# Patient Record
Sex: MALE | Race: BLACK OR AFRICAN AMERICAN | Employment: PART TIME | URBAN - METROPOLITAN AREA
[De-identification: names, ages, dates, MRNs, and addresses within clinical notes are randomized per-mention and may not be internally consistent; named-entity substitution may affect disease eponyms.]

---
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
## 2019-06-11 ENCOUNTER — OFFICE VISIT (OUTPATIENT)
Dept: PRIMARY CARE CLINIC | Age: 19
End: 2019-06-11
Payer: MEDICAID

## 2019-06-11 VITALS
BODY MASS INDEX: 23.8 KG/M2 | HEIGHT: 75 IN | HEART RATE: 61 BPM | TEMPERATURE: 98.2 F | WEIGHT: 191.4 LBS | DIASTOLIC BLOOD PRESSURE: 69 MMHG | SYSTOLIC BLOOD PRESSURE: 125 MMHG

## 2019-06-11 DIAGNOSIS — Z72.89 OTHER PROBLEMS RELATED TO LIFESTYLE: ICD-10-CM

## 2019-06-11 DIAGNOSIS — Z71.6 TOBACCO ABUSE COUNSELING: ICD-10-CM

## 2019-06-11 DIAGNOSIS — Z76.89 ENCOUNTER TO ESTABLISH CARE: ICD-10-CM

## 2019-06-11 DIAGNOSIS — Z72.0 TOBACCO ABUSE: Primary | ICD-10-CM

## 2019-06-11 PROCEDURE — G8420 CALC BMI NORM PARAMETERS: HCPCS | Performed by: PHYSICIAN ASSISTANT

## 2019-06-11 PROCEDURE — G8427 DOCREV CUR MEDS BY ELIG CLIN: HCPCS | Performed by: PHYSICIAN ASSISTANT

## 2019-06-11 PROCEDURE — 99203 OFFICE O/P NEW LOW 30 MIN: CPT | Performed by: PHYSICIAN ASSISTANT

## 2019-06-11 PROCEDURE — 4004F PT TOBACCO SCREEN RCVD TLK: CPT | Performed by: PHYSICIAN ASSISTANT

## 2019-06-11 ASSESSMENT — ENCOUNTER SYMPTOMS: COUGH: 1

## 2019-06-11 NOTE — PROGRESS NOTES
[facility]  [address]  [address]  Dept: [phone]  Dept Fax: [phone]    New Patient Visit Note  Date of patient's visit:6/11/2019  Patient's Name:  Ariela Chand YOB: 2000            Patient Care Team:  Santo Moraes PA-C as PCP - General (Physician Assistant)  Santo Moraes PA-C as PCP - [facility]  Tucker Cano MD  ______________________________________________________________________    Reason for visit: Establish care/Preventative care  ______________________________________________________________________  Chief Compliant   Establish Care      ______________________________________________________________________  History of Presenting Illness:  History was obtained from the patient. Ariela Chand is a 20 y.o. is here to establish care. Patient denies any complaints in office. Patient denies any chronic past medical history. Patient blood pressure stable in office. Patient weight is stable. Patient is a smoker, 3 to 4 cigars daily. Discussed tobacco cessation and risk of smoking, encourage patient to quit smoking. Labs reviewed, inform patient lab work will be ordered to have completed before follow-up appointment. Health maintenance reviewed. Medications reviewed. HPI  ______________________________________________________________________  Past Medical/Surgical History:    History reviewed. No pertinent past medical history. History reviewed. No pertinent surgical history.     ______________________________________________________________________  Health Maintenance Due   Topic Date Due    Pneumococcal 0-64 years Vaccine (1 of 1 - PPSV23) 03/30/2005    Varicella Vaccine (1 of 2 - 13+ 2-dose series) 03/30/2012    HPV vaccine (1 - Male 3-dose series) 03/30/2014    HIV screen  03/30/2014    DTaP/Tdap/Td vaccine (1 - Tdap) 03/30/2018       No Known Future        ______________________________________________________________________  Follow up and instructions:  Return in about 2 months (around 8/11/2019) for Physical, Lab Review. · Denver Grey received counseling on the following healthy behaviors:tobacco cessation    · Discussed use, benefit, and side effects of prescribed medications. Barriers to medication compliance addressed. All patient questions answered. Pt voiced understanding. · Patient given educational materials - see patient instructions    Electronically signed by Yumiko Erwin PA-C on 6/11/19 at 3:35 PM    This note is created with the assistance of a speech-recognition program. While intending to generate a document that actually reflects thecontent of the visit, the document can still have some mistakes which may not have been identified and corrected by editing.

## 2019-06-17 PROBLEM — Z71.6 TOBACCO ABUSE COUNSELING: Status: ACTIVE | Noted: 2019-06-17

## 2019-06-17 ASSESSMENT — ENCOUNTER SYMPTOMS
DIARRHEA: 0
RHINORRHEA: 0
ABDOMINAL PAIN: 0
WHEEZING: 0
CONSTIPATION: 0
NAUSEA: 0
SHORTNESS OF BREATH: 0
VOMITING: 0
SORE THROAT: 0
BACK PAIN: 0

## 2020-06-10 ENCOUNTER — HOSPITAL ENCOUNTER (EMERGENCY)
Age: 20
Discharge: HOME | End: 2020-06-10
Attending: EMERGENCY MEDICINE
Payer: COMMERCIAL

## 2020-06-10 VITALS
OXYGEN SATURATION: 100 % | HEART RATE: 60 BPM | HEIGHT: 75 IN | BODY MASS INDEX: 28.1 KG/M2 | SYSTOLIC BLOOD PRESSURE: 148 MMHG | TEMPERATURE: 98.2 F | DIASTOLIC BLOOD PRESSURE: 82 MMHG | RESPIRATION RATE: 16 BRPM | WEIGHT: 226 LBS

## 2020-06-10 PROCEDURE — 99282 EMERGENCY DEPT VISIT SF MDM: CPT

## 2020-06-10 RX ORDER — FLUTICASONE PROPIONATE 50 MCG
1 SPRAY, SUSPENSION (ML) NASAL 2 TIMES DAILY
Qty: 1 BOTTLE | Refills: 0 | Status: SHIPPED | OUTPATIENT
Start: 2020-06-10 | End: 2022-05-03

## 2020-06-10 ASSESSMENT — ENCOUNTER SYMPTOMS
ABDOMINAL PAIN: 0
FACIAL SWELLING: 0
BACK PAIN: 0
SORE THROAT: 0
SHORTNESS OF BREATH: 0
NAUSEA: 0
VOMITING: 0
SINUS PRESSURE: 0
COUGH: 0

## 2020-06-10 ASSESSMENT — PAIN SCALES - GENERAL: PAINLEVEL_OUTOF10: 4

## 2020-06-10 NOTE — ED PROVIDER NOTES
[address] [place] [address] ED  EMERGENCY DEPARTMENT ENCOUNTER      Pt Name: Celina Starks  MRN: [id number]  [place] 2000  Date of evaluation: 6/10/20  CHIEF COMPLAINT       Chief Complaint   Patient presents with    Otalgia     HISTORY OF PRESENT ILLNESS   Presents the emergency room via private auto with right ear pain that started 4 days ago. Pain is constant without aggravating or alleviating factors. No associated congestion, sore throat, cough, chest pain, shortness of breath, body aches or fevers. This is his first evaluation for this ear pain. He has not taken anything for the pain. The history is provided by the patient. REVIEW OF SYSTEMS     Review of Systems   Constitutional: Negative for activity change, fatigue and fever. HENT: Positive for ear pain. Negative for congestion, ear discharge, facial swelling, sinus pressure and sore throat. Respiratory: Negative for cough and shortness of breath. Gastrointestinal: Negative for abdominal pain, nausea and vomiting. Musculoskeletal: Negative for back pain and myalgias. Neurological: Negative for dizziness and headaches. Psychiatric/Behavioral: Negative for confusion and decreased concentration. PASTMEDICAL HISTORY   History reviewed. No pertinent past medical history. SURGICAL HISTORY     History reviewed. No pertinent surgical history. CURRENT MEDICATIONS       Previous Medications    No medications on file     ALLERGIES     has No Known Allergies. FAMILY HISTORY     He indicated that his mother is alive. He indicated that his father is alive. He indicated that his maternal grandmother is alive. He indicated that his maternal grandfather is alive. He indicated that his paternal grandmother is . He indicated that his paternal grandfather is .      SOCIAL HISTORY       Social History     Tobacco Use    Smoking status: Current Every Day Smoker     Years: 1.00     Types: Cigars    Smokeless tobacco: Never Used   Nils Walker

## 2021-07-16 ENCOUNTER — APPOINTMENT (OUTPATIENT)
Dept: CT IMAGING | Age: 21
End: 2021-07-16
Payer: COMMERCIAL

## 2021-07-16 ENCOUNTER — HOSPITAL ENCOUNTER (EMERGENCY)
Age: 21
Discharge: HOME | End: 2021-07-16
Attending: EMERGENCY MEDICINE
Payer: COMMERCIAL

## 2021-07-16 VITALS
SYSTOLIC BLOOD PRESSURE: 134 MMHG | WEIGHT: 227 LBS | HEIGHT: 75 IN | BODY MASS INDEX: 28.23 KG/M2 | DIASTOLIC BLOOD PRESSURE: 87 MMHG | TEMPERATURE: 98.4 F | RESPIRATION RATE: 14 BRPM | HEART RATE: 98 BPM | OXYGEN SATURATION: 98 %

## 2021-07-16 DIAGNOSIS — J02.0 STREP PHARYNGITIS: Primary | ICD-10-CM

## 2021-07-16 LAB
ABSOLUTE EOS #: 0.23 K/UL (ref 0–0.44)
ABSOLUTE IMMATURE GRANULOCYTE: 0.23 K/UL (ref 0–0.3)
ABSOLUTE LYMPH #: 0.9 K/UL (ref 1.1–3.7)
ABSOLUTE MONO #: 1.58 K/UL (ref 0.1–1.2)
ANION GAP SERPL CALCULATED.3IONS-SCNC: 13 MMOL/L (ref 9–17)
BASOPHILS # BLD: 1 % (ref 0–2)
BASOPHILS ABSOLUTE: 0.23 K/UL (ref 0–0.2)
BUN BLDV-MCNC: 9 MG/DL (ref 6–20)
BUN/CREAT BLD: 12 (ref 9–20)
CALCIUM SERPL-MCNC: 9.6 MG/DL (ref 8.6–10.4)
CHLORIDE BLD-SCNC: 101 MMOL/L (ref 98–107)
CO2: 22 MMOL/L (ref 20–31)
CREAT SERPL-MCNC: 0.76 MG/DL (ref 0.7–1.2)
DIFFERENTIAL TYPE: ABNORMAL
DIRECT EXAM: ABNORMAL
EOSINOPHILS RELATIVE PERCENT: 1 % (ref 1–4)
GFR AFRICAN AMERICAN: >60 ML/MIN
GFR NON-AFRICAN AMERICAN: >60 ML/MIN
GFR SERPL CREATININE-BSD FRML MDRD: ABNORMAL ML/MIN/{1.73_M2}
GFR SERPL CREATININE-BSD FRML MDRD: ABNORMAL ML/MIN/{1.73_M2}
GLUCOSE BLD-MCNC: 109 MG/DL (ref 70–99)
HCT VFR BLD CALC: 44.7 % (ref 40.7–50.3)
HEMOGLOBIN: 14.9 G/DL (ref 13–17)
IMMATURE GRANULOCYTES: 1 %
LYMPHOCYTES # BLD: 4 % (ref 24–43)
Lab: ABNORMAL
MCH RBC QN AUTO: 29.7 PG (ref 25.2–33.5)
MCHC RBC AUTO-ENTMCNC: 33.3 G/DL (ref 28.4–34.8)
MCV RBC AUTO: 89.2 FL (ref 82.6–102.9)
MONOCYTES # BLD: 7 % (ref 3–12)
NRBC AUTOMATED: 0 PER 100 WBC
PDW BLD-RTO: 12.3 % (ref 11.8–14.4)
PLATELET # BLD: 259 K/UL (ref 138–453)
PLATELET ESTIMATE: ABNORMAL
PMV BLD AUTO: 8.8 FL (ref 8.1–13.5)
POTASSIUM SERPL-SCNC: 3.8 MMOL/L (ref 3.7–5.3)
RBC # BLD: 5.01 M/UL (ref 4.21–5.77)
RBC # BLD: ABNORMAL 10*6/UL
SEG NEUTROPHILS: 86 % (ref 36–65)
SEGMENTED NEUTROPHILS ABSOLUTE COUNT: 19.33 K/UL (ref 1.5–8.1)
SODIUM BLD-SCNC: 136 MMOL/L (ref 135–144)
SPECIMEN DESCRIPTION: ABNORMAL
WBC # BLD: 22.5 K/UL (ref 3.5–11.3)
WBC # BLD: ABNORMAL 10*3/UL

## 2021-07-16 PROCEDURE — 85025 COMPLETE CBC W/AUTO DIFF WBC: CPT

## 2021-07-16 PROCEDURE — 2500000003 HC RX 250 WO HCPCS: Performed by: EMERGENCY MEDICINE

## 2021-07-16 PROCEDURE — 96375 TX/PRO/DX INJ NEW DRUG ADDON: CPT

## 2021-07-16 PROCEDURE — 2580000003 HC RX 258: Performed by: EMERGENCY MEDICINE

## 2021-07-16 PROCEDURE — 6370000000 HC RX 637 (ALT 250 FOR IP): Performed by: EMERGENCY MEDICINE

## 2021-07-16 PROCEDURE — 80048 BASIC METABOLIC PNL TOTAL CA: CPT

## 2021-07-16 PROCEDURE — 87880 STREP A ASSAY W/OPTIC: CPT

## 2021-07-16 PROCEDURE — 6360000004 HC RX CONTRAST MEDICATION: Performed by: EMERGENCY MEDICINE

## 2021-07-16 PROCEDURE — 96365 THER/PROPH/DIAG IV INF INIT: CPT

## 2021-07-16 PROCEDURE — 70491 CT SOFT TISSUE NECK W/DYE: CPT

## 2021-07-16 PROCEDURE — 99283 EMERGENCY DEPT VISIT LOW MDM: CPT

## 2021-07-16 PROCEDURE — 6360000002 HC RX W HCPCS: Performed by: EMERGENCY MEDICINE

## 2021-07-16 RX ORDER — CLINDAMYCIN HYDROCHLORIDE 300 MG/1
450 CAPSULE ORAL 3 TIMES DAILY
Qty: 42 CAPSULE | Refills: 0 | Status: SHIPPED | OUTPATIENT
Start: 2021-07-16 | End: 2021-07-23

## 2021-07-16 RX ORDER — CLINDAMYCIN PHOSPHATE 600 MG/50ML
600 INJECTION INTRAVENOUS ONCE
Status: COMPLETED | OUTPATIENT
Start: 2021-07-16 | End: 2021-07-16

## 2021-07-16 RX ORDER — SODIUM CHLORIDE 0.9 % (FLUSH) 0.9 %
10 SYRINGE (ML) INJECTION PRN
Status: DISCONTINUED | OUTPATIENT
Start: 2021-07-16 | End: 2021-07-16 | Stop reason: HOSPADM

## 2021-07-16 RX ORDER — KETOROLAC TROMETHAMINE 30 MG/ML
30 INJECTION, SOLUTION INTRAMUSCULAR; INTRAVENOUS ONCE
Status: COMPLETED | OUTPATIENT
Start: 2021-07-16 | End: 2021-07-16

## 2021-07-16 RX ORDER — 0.9 % SODIUM CHLORIDE 0.9 %
1000 INTRAVENOUS SOLUTION INTRAVENOUS ONCE
Status: COMPLETED | OUTPATIENT
Start: 2021-07-16 | End: 2021-07-16

## 2021-07-16 RX ORDER — ACETAMINOPHEN 500 MG
1000 TABLET ORAL ONCE
Status: COMPLETED | OUTPATIENT
Start: 2021-07-16 | End: 2021-07-16

## 2021-07-16 RX ORDER — HYDROCODONE BITARTRATE AND ACETAMINOPHEN 5; 325 MG/1; MG/1
1 TABLET ORAL EVERY 6 HOURS PRN
Qty: 12 TABLET | Refills: 0 | Status: SHIPPED | OUTPATIENT
Start: 2021-07-16 | End: 2021-07-19

## 2021-07-16 RX ORDER — DEXAMETHASONE SODIUM PHOSPHATE 10 MG/ML
10 INJECTION, SOLUTION INTRAMUSCULAR; INTRAVENOUS ONCE
Status: COMPLETED | OUTPATIENT
Start: 2021-07-16 | End: 2021-07-16

## 2021-07-16 RX ORDER — 0.9 % SODIUM CHLORIDE 0.9 %
80 INTRAVENOUS SOLUTION INTRAVENOUS ONCE
Status: COMPLETED | OUTPATIENT
Start: 2021-07-16 | End: 2021-07-16

## 2021-07-16 RX ADMIN — CLINDAMYCIN PHOSPHATE 600 MG: 600 INJECTION, SOLUTION INTRAVENOUS at 08:48

## 2021-07-16 RX ADMIN — IOPAMIDOL 75 ML: 755 INJECTION, SOLUTION INTRAVENOUS at 08:42

## 2021-07-16 RX ADMIN — KETOROLAC TROMETHAMINE 30 MG: 30 INJECTION, SOLUTION INTRAMUSCULAR at 08:22

## 2021-07-16 RX ADMIN — SODIUM CHLORIDE 80 ML: 9 INJECTION, SOLUTION INTRAVENOUS at 08:43

## 2021-07-16 RX ADMIN — SODIUM CHLORIDE, PRESERVATIVE FREE 10 ML: 5 INJECTION INTRAVENOUS at 08:42

## 2021-07-16 RX ADMIN — SODIUM CHLORIDE 1000 ML: 9 INJECTION, SOLUTION INTRAVENOUS at 10:20

## 2021-07-16 RX ADMIN — ACETAMINOPHEN 1000 MG: 500 TABLET ORAL at 08:22

## 2021-07-16 RX ADMIN — DEXAMETHASONE SODIUM PHOSPHATE 10 MG: 10 INJECTION, SOLUTION INTRAMUSCULAR; INTRAVENOUS at 08:22

## 2021-07-16 ASSESSMENT — PAIN SCALES - GENERAL: PAINLEVEL_OUTOF10: 7

## 2021-07-16 ASSESSMENT — ENCOUNTER SYMPTOMS
CHEST TIGHTNESS: 0
ABDOMINAL DISTENTION: 0
TROUBLE SWALLOWING: 0
SHORTNESS OF BREATH: 0
SORE THROAT: 1
BACK PAIN: 0
EYE DISCHARGE: 0
ABDOMINAL PAIN: 0
VOICE CHANGE: 0
EYE PAIN: 0
FACIAL SWELLING: 0

## 2021-07-16 ASSESSMENT — PAIN DESCRIPTION - LOCATION: LOCATION: THROAT

## 2021-07-16 NOTE — ED PROVIDER NOTES
that his mother is alive. He indicated that his father is alive. He indicated that his maternal grandmother is alive. He indicated that his maternal grandfather is alive. He indicated that his paternal grandmother is . He indicated that his paternal grandfather is . SOCIAL HISTORY       Social History     Tobacco Use    Smoking status: Current Every Day Smoker     Years: 1.00     Types: Cigars    Smokeless tobacco: Never Used    Tobacco comment: 3-4 cigars daily as of 18   Substance Use Topics    Alcohol use: No     Comment: not often    Drug use: No     Types: Marijuana     PHYSICAL EXAM     INITIAL VITALS: /87   Pulse 98   Temp 98.4 °F (36.9 °C) (Oral)   Resp 14   Ht 6' 3\" (1.905 m)   Wt 227 lb (103 kg)   SpO2 98%   BMI 28.37 kg/m²    Physical Exam  Vitals and nursing note reviewed. Constitutional:       General: He is not in acute distress. Appearance: He is well-developed. He is not diaphoretic. HENT:      Head: Normocephalic and atraumatic. Comments: Uvula midline, nonedematous. Bilateral tonsillar exudate erythema. There is also edema to the left tonsil concerning for possible abscess. No gingival edema no sublingual edema. Mouth/Throat: Tonsils: Tonsillar exudate and tonsillar abscess present. 1+ on the right. 2+ on the left. Eyes:      Pupils: Pupils are equal, round, and reactive to light. Cardiovascular:      Rate and Rhythm: Normal rate and regular rhythm. Pulmonary:      Effort: Pulmonary effort is normal.      Breath sounds: Normal breath sounds. Abdominal:      General: Bowel sounds are normal.      Palpations: Abdomen is soft. Musculoskeletal:         General: Normal range of motion. Cervical back: Normal range of motion and neck supple. Skin:     General: Skin is warm. Capillary Refill: Capillary refill takes less than 2 seconds.    Neurological:      Mental Status: He is alert and oriented to person, place, and time.         MEDICAL DECISION MAKING:   The patient is a 24-year-old male who presented to the emergency department secondary to sore throat. Differential diagnosis included but not limited to strep pharyngitis, peritonsillar abscess there is no sublingual edema less likely Ronen angina. Patient in no acute distress tolerating secretions able to swallow without difficulty. Rapid strep ordered including labs, IV placed patient received Decadron, Toradol as well as Tylenol and orders for CT soft tissue neck. Patient initiated antibiotics with clindamycin. Patient will be reevaluated. CT negative for acute abscess or other deep space infection. Rapid strep positive. Patient reevaluated temperature resolved 98.4, leukocytosis of 22 with acute findings on laboratory analysis. Patient reevaluated able to tolerate oral talking in complete sentences no drooling discussed with patient results elected for discharge home with antibiotics pain control and steroids. Given outpatient follow-up and parameters to return to the emergency department. All patient's question's and concerns were answered prior to disposition and patient and/or family expressed understanding and agreement of treatment plan. CRITICAL CARE:              NIH STROKE SCALE:            PROCEDURES:    Procedures    DIAGNOSTIC RESULTS   EKG:All EKG's are interpreted by the Emergency Department Physician who either signs or Co-signs this chart in the absence of a cardiologist.        RADIOLOGY:All plain film, CT, MRI, and formal ultrasound images (except ED bedside ultrasound) are read by the radiologist, see reports below, unless otherwisenoted in MDM or here. CT SOFT TISSUE NECK W CONTRAST   Final Result   No acute abnormality of the soft tissue structures of the neck. Few prominent lymph nodes bilaterally that are likely reactive.            LABS: All lab results were reviewed by myself, and all abnormals are listed hours as needed for Pain for up to 3 days. Intended supply: 3 days. Take lowest dose possible to manage pain     Dispense:  12 tablet     Refill:  0     CONSULTS:  None    FINAL IMPRESSION      1. Strep pharyngitis          DISPOSITION/PLAN   DISPOSITION Discharge - Pending Orders Complete 07/16/2021 10:37:17 AM      PATIENT REFERRED TO:  Tahir Barber PA-C  [address] [place] [address]  [phone]          DISCHARGE MEDICATIONS:  New Prescriptions    CLINDAMYCIN (CLEOCIN) 300 MG CAPSULE    Take 2 capsules by mouth 3 times daily for 7 days    HYDROCODONE-ACETAMINOPHEN (NORCO) 5-325 MG PER TABLET    Take 1 tablet by mouth every 6 hours as needed for Pain for up to 3 days. Intended supply: 3 days. Take lowest dose possible to manage pain    IBUPROFEN (MOTRIN) 40 MG/ML SUSP    Take 15 mLs by mouth every 6 hours as needed for Pain     Ayesha Yepez MD  Attending Emergency Physician    This note was created with the assistance of a speech-recognition program. While intending to generate a document that actually reflects the content of the visit, no guarantees can be provided that every mistake has been identified and corrected by editing.                     Ayesha Yepez MD  48/52/83 1037

## 2022-01-08 ENCOUNTER — APPOINTMENT (OUTPATIENT)
Dept: GENERAL RADIOLOGY | Age: 22
End: 2022-01-08
Payer: COMMERCIAL

## 2022-01-08 ENCOUNTER — HOSPITAL ENCOUNTER (EMERGENCY)
Age: 22
Discharge: HOME | End: 2022-01-08
Attending: EMERGENCY MEDICINE
Payer: COMMERCIAL

## 2022-01-08 VITALS
BODY MASS INDEX: 29.52 KG/M2 | OXYGEN SATURATION: 100 % | DIASTOLIC BLOOD PRESSURE: 74 MMHG | HEIGHT: 75 IN | WEIGHT: 237.4 LBS | SYSTOLIC BLOOD PRESSURE: 140 MMHG | HEART RATE: 67 BPM | RESPIRATION RATE: 20 BRPM | TEMPERATURE: 97.9 F

## 2022-01-08 DIAGNOSIS — S39.012A STRAIN OF LUMBAR REGION, INITIAL ENCOUNTER: ICD-10-CM
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
## 2022-01-08 PROCEDURE — 72100 X-RAY EXAM L-S SPINE 2/3 VWS: CPT

## 2022-01-08 PROCEDURE — 72072 X-RAY EXAM THORAC SPINE 3VWS: CPT

## 2022-01-08 PROCEDURE — 6370000000 HC RX 637 (ALT 250 FOR IP): Performed by: EMERGENCY MEDICINE

## 2022-01-08 PROCEDURE — 99283 EMERGENCY DEPT VISIT LOW MDM: CPT

## 2022-01-08 RX ORDER — IBUPROFEN 800 MG/1
800 TABLET ORAL EVERY 8 HOURS PRN
Qty: 30 TABLET | Refills: 0 | Status: SHIPPED | OUTPATIENT
Start: 2022-01-08 | End: 2022-05-03

## 2022-01-08 RX ORDER — CYCLOBENZAPRINE HCL 10 MG
10 TABLET ORAL 3 TIMES DAILY PRN
Qty: 21 TABLET | Refills: 0 | Status: SHIPPED | OUTPATIENT
Start: 2022-01-08 | End: 2022-01-18

## 2022-01-08 RX ORDER — IBUPROFEN 800 MG/1
800 TABLET ORAL ONCE
Status: COMPLETED | OUTPATIENT
Start: 2022-01-08 | End: 2022-01-08

## 2022-01-08 RX ADMIN — IBUPROFEN 800 MG: 800 TABLET, FILM COATED ORAL at 17:09

## 2022-01-08 ASSESSMENT — ENCOUNTER SYMPTOMS
SHORTNESS OF BREATH: 0
RHINORRHEA: 0
DIARRHEA: 0
EYE REDNESS: 0
SORE THROAT: 0
EYE DISCHARGE: 0
VOMITING: 0
BACK PAIN: 1
NAUSEA: 0
COLOR CHANGE: 0
COUGH: 0

## 2022-01-08 ASSESSMENT — PAIN SCALES - GENERAL
PAINLEVEL_OUTOF10: 7
PAINLEVEL_OUTOF10: 7
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
## 2022-01-11 ASSESSMENT — ENCOUNTER SYMPTOMS
DIARRHEA: 0
COLOR CHANGE: 0
BACK PAIN: 1
EYE DISCHARGE: 0
SHORTNESS OF BREATH: 0
RHINORRHEA: 0
SORE THROAT: 0
NAUSEA: 0
COUGH: 0
EYE REDNESS: 0
VOMITING: 0

## 2022-05-03 ENCOUNTER — OFFICE VISIT (OUTPATIENT)
Dept: PRIMARY CARE CLINIC | Age: 22
End: 2022-05-03
Payer: COMMERCIAL

## 2022-05-03 VITALS
WEIGHT: 242 LBS | HEIGHT: 75 IN | HEART RATE: 68 BPM | OXYGEN SATURATION: 97 % | DIASTOLIC BLOOD PRESSURE: 76 MMHG | SYSTOLIC BLOOD PRESSURE: 122 MMHG | BODY MASS INDEX: 30.09 KG/M2

## 2022-05-03 DIAGNOSIS — M54.50 CHRONIC BILATERAL LOW BACK PAIN WITHOUT SCIATICA: Primary | ICD-10-CM

## 2022-05-03 DIAGNOSIS — G89.29 CHRONIC BILATERAL LOW BACK PAIN WITHOUT SCIATICA: Primary | ICD-10-CM

## 2022-05-03 PROCEDURE — 99203 OFFICE O/P NEW LOW 30 MIN: CPT | Performed by: PHYSICIAN ASSISTANT

## 2022-05-03 PROCEDURE — G8419 CALC BMI OUT NRM PARAM NOF/U: HCPCS | Performed by: PHYSICIAN ASSISTANT

## 2022-05-03 PROCEDURE — G8427 DOCREV CUR MEDS BY ELIG CLIN: HCPCS | Performed by: PHYSICIAN ASSISTANT

## 2022-05-03 PROCEDURE — 4004F PT TOBACCO SCREEN RCVD TLK: CPT | Performed by: PHYSICIAN ASSISTANT

## 2022-05-03 SDOH — ECONOMIC STABILITY: FOOD INSECURITY: WITHIN THE PAST 12 MONTHS, YOU WORRIED THAT YOUR FOOD WOULD RUN OUT BEFORE YOU GOT MONEY TO BUY MORE.: NEVER TRUE

## 2022-05-03 SDOH — ECONOMIC STABILITY: FOOD INSECURITY: WITHIN THE PAST 12 MONTHS, THE FOOD YOU BOUGHT JUST DIDN'T LAST AND YOU DIDN'T HAVE MONEY TO GET MORE.: NEVER TRUE

## 2022-05-03 ASSESSMENT — ENCOUNTER SYMPTOMS
ABDOMINAL PAIN: 0
SORE THROAT: 0
RHINORRHEA: 0
ABDOMINAL DISTENTION: 0
COUGH: 0
EYE DISCHARGE: 0
CHEST TIGHTNESS: 0
DIARRHEA: 0
BACK PAIN: 1
SHORTNESS OF BREATH: 0
PHOTOPHOBIA: 0
SINUS PRESSURE: 0
CONSTIPATION: 0
VOMITING: 0

## 2022-05-03 ASSESSMENT — PATIENT HEALTH QUESTIONNAIRE - PHQ9
2. FEELING DOWN, DEPRESSED OR HOPELESS: 0
SUM OF ALL RESPONSES TO PHQ QUESTIONS 1-9: 0
SUM OF ALL RESPONSES TO PHQ QUESTIONS 1-9: 0
1. LITTLE INTEREST OR PLEASURE IN DOING THINGS: 0
SUM OF ALL RESPONSES TO PHQ QUESTIONS 1-9: 0
SUM OF ALL RESPONSES TO PHQ QUESTIONS 1-9: 0
SUM OF ALL RESPONSES TO PHQ9 QUESTIONS 1 & 2: 0

## 2022-05-03 ASSESSMENT — SOCIAL DETERMINANTS OF HEALTH (SDOH): HOW HARD IS IT FOR YOU TO PAY FOR THE VERY BASICS LIKE FOOD, HOUSING, MEDICAL CARE, AND HEATING?: NOT HARD AT ALL
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
## 2022-05-03 NOTE — PATIENT INSTRUCTIONS
Patient Education        Low Back Pain: Exercises  Introduction  Here are some examples of exercises for you to try. The exercises may be suggested for a condition or for rehabilitation. Start each exercise slowly. Ease off the exercises if you start to have pain. You will be told when to start these exercises and which ones will work bestfor you. How to do the exercises  Press-up    1. Lie on your stomach, supporting your body with your forearms. 2. Press your elbows down into the floor to raise your upper back. As you do this, relax your stomach muscles and allow your back to arch without using your back muscles. As your press up, do not let your hips or pelvis come off the floor. 3. Hold for 15 to 30 seconds, then relax. 4. Repeat 2 to 4 times. Alternate arm and leg (bird dog) exercise    Do this exercise slowly. Try to keep your body straight at all times, and donot let one hip drop lower than the other. 1. Start on the floor, on your hands and knees. 2. Tighten your belly muscles. 3. Raise one leg off the floor, and hold it straight out behind you. Be careful not to let your hip drop down, because that will twist your trunk. 4. Hold for about 6 seconds, then lower your leg and switch to the other leg. 5. Repeat 8 to 12 times on each leg. 6. Over time, work up to holding for 10 to 30 seconds each time. 7. If you feel stable and secure with your leg raised, try raising the opposite arm straight out in front of you at the same time. Knee-to-chest exercise    1. Lie on your back with your knees bent and your feet flat on the floor. 2. Bring one knee to your chest, keeping the other foot flat on the floor (or keeping the other leg straight, whichever feels better on your lower back). 3. Keep your lower back pressed to the floor. Hold for at least 15 to 30 seconds. 4. Relax, and lower the knee to the starting position. 5. Repeat with the other leg. Repeat 2 to 4 times with each leg.   6. To get more stretch, put your other leg flat on the floor while pulling your knee to your chest.  Curl-ups    1. Lie on the floor on your back with your knees bent at a 90-degree angle. Your feet should be flat on the floor, about 12 inches from your buttocks. 2. Cross your arms over your chest. If this bothers your neck, try putting your hands behind your neck (not your head), with your elbows spread apart. 3. Slowly tighten your belly muscles and raise your shoulder blades off the floor. 4. Keep your head in line with your body, and do not press your chin to your chest.  5. Hold this position for 1 or 2 seconds, then slowly lower yourself back down to the floor. 6. Repeat 8 to 12 times. Pelvic tilt exercise    1. Lie on your back with your knees bent. 2. \"Brace\" your stomach. This means to tighten your muscles by pulling in and imagining your belly button moving toward your spine. You should feel like your back is pressing to the floor and your hips and pelvis are rocking back. 3. Hold for about 6 seconds while you breathe smoothly. 4. Repeat 8 to 12 times. Heel dig bridging    1. Lie on your back with both knees bent and your ankles bent so that only your heels are digging into the floor. Your knees should be bent about 90 degrees. 2. Then push your heels into the floor, squeeze your buttocks, and lift your hips off the floor until your shoulders, hips, and knees are all in a straight line. 3. Hold for about 6 seconds as you continue to breathe normally, and then slowly lower your hips back down to the floor and rest for up to 10 seconds. 4. Do 8 to 12 repetitions. Hamstring stretch in doorway    1. Lie on your back in a doorway, with one leg through the open door. 2. Slide your leg up the wall to straighten your knee. You should feel a gentle stretch down the back of your leg. 3. Hold the stretch for at least 15 to 30 seconds. Do not arch your back, point your toes, or bend either knee.  Keep one heel touching the floor and the other heel touching the wall. 4. Repeat with your other leg. 5. Do 2 to 4 times for each leg. Hip flexor stretch    1. Kneel on the floor with one knee bent and one leg behind you. Place your forward knee over your foot. Keep your other knee touching the floor. 2. Slowly push your hips forward until you feel a stretch in the upper thigh of your rear leg. 3. Hold the stretch for at least 15 to 30 seconds. Repeat with your other leg. 4. Do 2 to 4 times on each side. Wall sit    1. Stand with your back 10 to 12 inches away from a wall. 2. Lean into the wall until your back is flat against it. 3. Slowly slide down until your knees are slightly bent, pressing your lower back into the wall. 4. Hold for about 6 seconds, then slide back up the wall. 5. Repeat 8 to 12 times. Follow-up care is a key part of your treatment and safety. Be sure to make and go to all appointments, and call your doctor if you are having problems. It's also a good idea to know your test results and keep alist of the medicines you take. Where can you learn more? Go to https://mii.[employer]. org and sign in to your [employer] account. Enter F103 in the [facility] box to learn more about \"Low Back Pain: Exercises. \"     If you do not have an account, please click on the \"Sign Up Now\" link. Current as of: July 1, 2021               Content Version: 13.2  © 2006-2022 Healthwise, Incorporated. Care instructions adapted under license by [facility] ([facility]). If you have questions about a medical condition or this instruction, always ask your healthcare professional. [place] [address] any warranty or liability for your use of this information.

## 2022-05-23 ENCOUNTER — TELEPHONE (OUTPATIENT)
Dept: PRIMARY CARE CLINIC | Age: 22
End: 2022-05-23

## 2022-05-23 NOTE — TELEPHONE ENCOUNTER
He called said he received a call his FMLA forms were not completed. Part B #10 was not completed. I believe the very first question was not answered about follow up appointments. Not sure about anything else may have to call the company. He asked for a call back as well.

## 2022-11-21 ENCOUNTER — TELEPHONE (OUTPATIENT)
Dept: PRIMARY CARE CLINIC | Age: 22
End: 2022-11-21

## 2022-11-29 ENCOUNTER — OFFICE VISIT (OUTPATIENT)
Dept: PRIMARY CARE CLINIC | Age: 22
End: 2022-11-29
Payer: COMMERCIAL

## 2022-11-29 VITALS
BODY MASS INDEX: 31.75 KG/M2 | HEART RATE: 83 BPM | SYSTOLIC BLOOD PRESSURE: 122 MMHG | DIASTOLIC BLOOD PRESSURE: 74 MMHG | WEIGHT: 254 LBS | OXYGEN SATURATION: 99 %

## 2022-11-29 DIAGNOSIS — M54.50 CHRONIC BILATERAL LOW BACK PAIN WITHOUT SCIATICA: ICD-10-CM

## 2022-11-29 DIAGNOSIS — G89.29 CHRONIC BILATERAL LOW BACK PAIN WITHOUT SCIATICA: ICD-10-CM

## 2022-11-29 DIAGNOSIS — Z00.00 HEALTH CARE MAINTENANCE: Primary | ICD-10-CM

## 2022-11-29 PROCEDURE — G8484 FLU IMMUNIZE NO ADMIN: HCPCS | Performed by: PHYSICIAN ASSISTANT

## 2022-11-29 PROCEDURE — 4004F PT TOBACCO SCREEN RCVD TLK: CPT | Performed by: PHYSICIAN ASSISTANT

## 2022-11-29 PROCEDURE — G8427 DOCREV CUR MEDS BY ELIG CLIN: HCPCS | Performed by: PHYSICIAN ASSISTANT

## 2022-11-29 PROCEDURE — G8417 CALC BMI ABV UP PARAM F/U: HCPCS | Performed by: PHYSICIAN ASSISTANT

## 2022-11-29 PROCEDURE — 99213 OFFICE O/P EST LOW 20 MIN: CPT | Performed by: PHYSICIAN ASSISTANT

## 2022-11-29 RX ORDER — MELOXICAM 15 MG/1
15 TABLET ORAL DAILY PRN
Qty: 30 TABLET | Refills: 0 | Status: SHIPPED | OUTPATIENT
Start: 2022-11-29

## 2022-11-29 ASSESSMENT — ENCOUNTER SYMPTOMS
BACK PAIN: 1
NAUSEA: 0
SHORTNESS OF BREATH: 0
DIARRHEA: 0
VOMITING: 0
CHEST TIGHTNESS: 0

## 2022-11-29 NOTE — PROGRESS NOTES
[address] [address]  Dept: [phone]    Roby Raphael is a 20 y.o. male Established patient, who presents today for his medical conditions/complaints as noted below. Chief Complaint   Patient presents with    Back Pain     Would like FMLA exteneded for back pain       HPI:     HPI: The patient is a pleasant 69-year-old male who presents today due to renew his FMLA. Patient still having low back pain at times worse with work. Patient currently has FMLA low back pain 4 occurrences per month. 1 day per occurrence. This is helping. Reviewed prior notes None  Reviewed previous Labs    No results found for: LDLCHOLESTEROL, LDLCALC    (goal LDL is <100)   BUN (mg/dL)   Date Value   07/16/2021 9     BP Readings from Last 3 Encounters:   11/29/22 122/74   05/03/22 122/76   01/08/22 (!) 140/74          (goal 120/80)  No results found for: LABA1C  No past medical history on file. No past surgical history on file. Family History   Problem Relation Age of Onset    Diabetes Father     Diabetes Paternal Grandmother     Cancer Paternal Grandmother        Social History     Tobacco Use    Smoking status: Some Days     Types: Cigars    Smokeless tobacco: Never    Tobacco comments:     3-4 cigars daily as of 6/11/19   Substance Use Topics    Alcohol use: No     Comment: not often      Current Outpatient Medications   Medication Sig Dispense Refill    meloxicam (MOBIC) 15 MG tablet Take 1 tablet by mouth daily as needed for Pain 30 tablet 0     No current facility-administered medications for this visit.      No Known Allergies    Health Maintenance   Topic Date Due    COVID-19 Vaccine (1) Never done    Pneumococcal 0-64 years Vaccine (1 - PCV) 03/30/2005    Varicella vaccine (2 of 2 - 2-dose childhood series) 07/09/2009    HIV screen  Never done    Hepatitis C screen  Never done    Flu vaccine (1) Never done    Depression Screen 05/03/2023    DTaP/Tdap/Td vaccine (7 - Td or Tdap) 04/27/2025    Hib vaccine  Completed    HPV vaccine  Completed    Meningococcal (ACWY) vaccine  Completed    Hepatitis A vaccine  Aged Out       Subjective:      Review of Systems   Constitutional:  Negative for chills and fever. HENT:  Negative for congestion and nosebleeds. Respiratory:  Negative for chest tightness and shortness of breath. Cardiovascular:  Negative for chest pain. Gastrointestinal:  Negative for diarrhea, nausea and vomiting. Musculoskeletal:  Positive for back pain. Negative for arthralgias, joint swelling, myalgias and neck stiffness. Skin:  Negative for rash. Psychiatric/Behavioral:  Negative for dysphoric mood and sleep disturbance. The patient is not nervous/anxious. Objective:     /74   Pulse 83   Wt 254 lb (115.2 kg)   SpO2 99%   BMI 31.75 kg/m²   Physical Exam  Constitutional:       General: He is not in acute distress. Appearance: Normal appearance. He is not ill-appearing. HENT:      Head: Normocephalic and atraumatic. Right Ear: External ear normal.      Left Ear: External ear normal.      Nose: Nose normal.      Mouth/Throat:      Mouth: Mucous membranes are moist.   Neck:      Vascular: No carotid bruit. Cardiovascular:      Rate and Rhythm: Normal rate and regular rhythm. Pulses: Normal pulses. Heart sounds: Normal heart sounds. Pulmonary:      Effort: Pulmonary effort is normal. No respiratory distress. Breath sounds: Normal breath sounds. Abdominal:      General: Bowel sounds are normal. There is no distension. Tenderness: There is no abdominal tenderness. Musculoskeletal:         General: Normal range of motion. Cervical back: Normal range of motion and neck supple. Lymphadenopathy:      Cervical: No cervical adenopathy. Skin:     General: Skin is warm and dry. Neurological:      General: No focal deficit present.       Mental Status: He is alert and oriented to person, place, and time. Psychiatric:         Mood and Affect: Mood normal.         Behavior: Behavior normal.         Thought Content: Thought content normal.       Assessment and Plan:          1. Health care maintenance  -     HIV Screen; Future  -     Hepatitis C Antibody; Future  2. Chronic bilateral low back pain without sciatica  -     meloxicam (MOBIC) 15 MG tablet; Take 1 tablet by mouth daily as needed for Pain, Disp-30 tablet, R-0Normal           No follow-ups on file. Patient given educational materials - see patient instructions. Discussed use, benefit, and side effects of prescribed medications. All patient questions answered. Pt voiced understanding. Reviewed health maintenance. Instructed to continue current medications, diet and exercise. Patient agreed with treatment plan. Follow up as directed.      Electronically signed by RAHUL Canas on 11/29/2022 at 4:47 PM

## 2023-05-16 ENCOUNTER — TELEPHONE (OUTPATIENT)
Dept: PRIMARY CARE CLINIC | Age: 23
End: 2023-05-16

## 2023-05-16 NOTE — TELEPHONE ENCOUNTER
She was last seen 11/2022 received her FMLA to update, she is asking for 2 days per week. She was previously getting 1. Please advise.

## 2023-05-16 NOTE — TELEPHONE ENCOUNTER
Will need to see ortho in order to get increased and perform functional capacity evaluation as this would make it too many days per month.

## 2023-05-26 ENCOUNTER — OFFICE VISIT (OUTPATIENT)
Dept: PRIMARY CARE CLINIC | Age: 23
End: 2023-05-26
Payer: COMMERCIAL

## 2023-05-26 VITALS
BODY MASS INDEX: 32.92 KG/M2 | WEIGHT: 264.8 LBS | HEART RATE: 70 BPM | DIASTOLIC BLOOD PRESSURE: 84 MMHG | SYSTOLIC BLOOD PRESSURE: 120 MMHG | OXYGEN SATURATION: 97 % | HEIGHT: 75 IN

## 2023-05-26 DIAGNOSIS — G89.29 CHRONIC BILATERAL LOW BACK PAIN WITHOUT SCIATICA: Primary | ICD-10-CM

## 2023-05-26 DIAGNOSIS — M54.50 CHRONIC BILATERAL LOW BACK PAIN WITHOUT SCIATICA: Primary | ICD-10-CM

## 2023-05-26 PROCEDURE — G8417 CALC BMI ABV UP PARAM F/U: HCPCS | Performed by: PHYSICIAN ASSISTANT

## 2023-05-26 PROCEDURE — 99214 OFFICE O/P EST MOD 30 MIN: CPT | Performed by: PHYSICIAN ASSISTANT

## 2023-05-26 PROCEDURE — 4004F PT TOBACCO SCREEN RCVD TLK: CPT | Performed by: PHYSICIAN ASSISTANT

## 2023-05-26 PROCEDURE — G8427 DOCREV CUR MEDS BY ELIG CLIN: HCPCS | Performed by: PHYSICIAN ASSISTANT

## 2023-05-26 SDOH — ECONOMIC STABILITY: INCOME INSECURITY: HOW HARD IS IT FOR YOU TO PAY FOR THE VERY BASICS LIKE FOOD, HOUSING, MEDICAL CARE, AND HEATING?: NOT HARD AT ALL

## 2023-05-26 SDOH — ECONOMIC STABILITY: FOOD INSECURITY: WITHIN THE PAST 12 MONTHS, THE FOOD YOU BOUGHT JUST DIDN'T LAST AND YOU DIDN'T HAVE MONEY TO GET MORE.: NEVER TRUE

## 2023-05-26 SDOH — ECONOMIC STABILITY: FOOD INSECURITY: WITHIN THE PAST 12 MONTHS, YOU WORRIED THAT YOUR FOOD WOULD RUN OUT BEFORE YOU GOT MONEY TO BUY MORE.: NEVER TRUE

## 2023-05-26 SDOH — ECONOMIC STABILITY: HOUSING INSECURITY
IN THE LAST 12 MONTHS, WAS THERE A TIME WHEN YOU DID NOT HAVE A STEADY PLACE TO SLEEP OR SLEPT IN A SHELTER (INCLUDING NOW)?: NO

## 2023-05-26 ASSESSMENT — ENCOUNTER SYMPTOMS
WHEEZING: 0
DIARRHEA: 0
SHORTNESS OF BREATH: 0
NAUSEA: 0
COUGH: 0
VOMITING: 0

## 2023-05-26 ASSESSMENT — PATIENT HEALTH QUESTIONNAIRE - PHQ9
SUM OF ALL RESPONSES TO PHQ QUESTIONS 1-9: 0
SUM OF ALL RESPONSES TO PHQ QUESTIONS 1-9: 0
SUM OF ALL RESPONSES TO PHQ9 QUESTIONS 1 & 2: 0
SUM OF ALL RESPONSES TO PHQ QUESTIONS 1-9: 0
2. FEELING DOWN, DEPRESSED OR HOPELESS: 0
1. LITTLE INTEREST OR PLEASURE IN DOING THINGS: 0
SUM OF ALL RESPONSES TO PHQ QUESTIONS 1-9: 0

## 2023-05-26 NOTE — PROGRESS NOTES
[address] [place] [address] PRIMARY CARE  [address] [address]  Dept: [phone]    Chelsi Macdonald is a 24 y.o. male Established patient, who presents today for his medical conditions/complaints as noted below. Chief Complaint   Patient presents with    Back Pain     Pt needs FMLA filled out. HPI:     HPI: The patient is here with concerns of back pain. He is noticing most pain with beding over. HE is doing home exercises without relief. Right now he gets one day off a week with FMLA he takes it every week. Would like this increased to 2 days a week. Will get mandated for 6 10 hour shifts a week. Reviewed prior notes None  Reviewed previous Labs    No results found for: LDLCHOLESTEROL, LDLCALC    (goal LDL is <100)   BUN (mg/dL)   Date Value   07/16/2021 9     BP Readings from Last 3 Encounters:   05/26/23 120/84   11/29/22 122/74   05/03/22 122/76          (goal 120/80)  No results found for: LABA1C  No past medical history on file. No past surgical history on file. Family History   Problem Relation Age of Onset    Diabetes Father     Diabetes Paternal Grandmother     Cancer Paternal Grandmother        Social History     Tobacco Use    Smoking status: Some Days     Types: Cigars    Smokeless tobacco: Never    Tobacco comments:     3-4 cigars daily as of 6/11/19   Substance Use Topics    Alcohol use: No     Comment: not often      No current outpatient medications on file. No current facility-administered medications for this visit.      No Known Allergies    Health Maintenance   Topic Date Due    COVID-19 Vaccine (1) Never done    Pneumococcal 0-64 years Vaccine (1 - PCV) 03/30/2005    Varicella vaccine (2 of 2 - 2-dose childhood series) 07/09/2009    HIV screen  Never done    Hepatitis C screen  Never done    Flu vaccine (Season Ended) 08/01/2023    Depression Screen  05/26/2024    DTaP/Tdap/Td vaccine (7 - Td or Tdap) 04/27/2025    Hib vaccine

## 2023-06-05 ENCOUNTER — TELEPHONE (OUTPATIENT)
Dept: PRIMARY CARE CLINIC | Age: 23
End: 2023-06-05

## 2023-06-05 NOTE — TELEPHONE ENCOUNTER
Pt was here on 5/26/23. His FMLA was sent over on the 31st. Asking for dates that he missed work be added to the form. Missed 5/9, 5/15, 5/16 and 5/22/23  Call pt if any questions.  KATHY

## 2023-06-20 ENCOUNTER — OFFICE VISIT (OUTPATIENT)
Dept: ORTHOPEDIC SURGERY | Age: 23
End: 2023-06-20
Payer: COMMERCIAL

## 2023-06-20 VITALS — RESPIRATION RATE: 12 BRPM | HEIGHT: 75 IN | WEIGHT: 264 LBS | BODY MASS INDEX: 32.83 KG/M2

## 2023-06-20 DIAGNOSIS — G89.29 CHRONIC BILATERAL LOW BACK PAIN WITHOUT SCIATICA: Primary | ICD-10-CM

## 2023-06-20 DIAGNOSIS — M54.50 CHRONIC BILATERAL LOW BACK PAIN WITHOUT SCIATICA: Primary | ICD-10-CM

## 2023-06-20 PROCEDURE — G8417 CALC BMI ABV UP PARAM F/U: HCPCS | Performed by: ORTHOPAEDIC SURGERY

## 2023-06-20 PROCEDURE — 4004F PT TOBACCO SCREEN RCVD TLK: CPT | Performed by: ORTHOPAEDIC SURGERY

## 2023-06-20 PROCEDURE — G8427 DOCREV CUR MEDS BY ELIG CLIN: HCPCS | Performed by: ORTHOPAEDIC SURGERY

## 2023-06-20 PROCEDURE — 99203 OFFICE O/P NEW LOW 30 MIN: CPT | Performed by: ORTHOPAEDIC SURGERY
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
## 2023-06-28 ENCOUNTER — TELEPHONE (OUTPATIENT)
Dept: PRIMARY CARE CLINIC | Age: 23
End: 2023-06-28

## 2023-08-16 ENCOUNTER — HOSPITAL ENCOUNTER (EMERGENCY)
Age: 23
Discharge: HOME | End: 2023-08-16
Attending: EMERGENCY MEDICINE
Payer: COMMERCIAL

## 2023-08-16 ENCOUNTER — APPOINTMENT (OUTPATIENT)
Dept: GENERAL RADIOLOGY | Age: 23
End: 2023-08-16
Payer: COMMERCIAL

## 2023-08-16 VITALS
BODY MASS INDEX: 32.5 KG/M2 | HEART RATE: 56 BPM | TEMPERATURE: 98.2 F | OXYGEN SATURATION: 99 % | WEIGHT: 260 LBS | SYSTOLIC BLOOD PRESSURE: 136 MMHG | DIASTOLIC BLOOD PRESSURE: 88 MMHG | RESPIRATION RATE: 16 BRPM

## 2023-08-16 DIAGNOSIS — S92.511A CLOSED DISPLACED FRACTURE OF PROXIMAL PHALANX OF LESSER TOE OF RIGHT FOOT, INITIAL ENCOUNTER: Primary | ICD-10-CM

## 2023-08-16 PROCEDURE — 6370000000 HC RX 637 (ALT 250 FOR IP): Performed by: NURSE PRACTITIONER

## 2023-08-16 PROCEDURE — 73630 X-RAY EXAM OF FOOT: CPT

## 2023-08-16 PROCEDURE — 99283 EMERGENCY DEPT VISIT LOW MDM: CPT

## 2023-08-16 RX ORDER — HYDROCODONE BITARTRATE AND ACETAMINOPHEN 5; 325 MG/1; MG/1
1 TABLET ORAL EVERY 6 HOURS PRN
Qty: 15 TABLET | Refills: 0 | Status: SHIPPED | OUTPATIENT
Start: 2023-08-16 | End: 2023-08-21

## 2023-08-16 RX ORDER — IBUPROFEN 800 MG/1
800 TABLET ORAL ONCE
Status: COMPLETED | OUTPATIENT
Start: 2023-08-16 | End: 2023-08-16

## 2023-08-16 RX ORDER — IBUPROFEN 800 MG/1
800 TABLET ORAL EVERY 8 HOURS PRN
Qty: 30 TABLET | Refills: 0 | Status: SHIPPED | OUTPATIENT
Start: 2023-08-16

## 2023-08-16 RX ADMIN — IBUPROFEN 800 MG: 800 TABLET ORAL at 19:32

## 2023-08-16 ASSESSMENT — PAIN DESCRIPTION - DESCRIPTORS: DESCRIPTORS: THROBBING;ACHING

## 2023-08-16 ASSESSMENT — PAIN DESCRIPTION - ORIENTATION: ORIENTATION: RIGHT

## 2023-08-16 ASSESSMENT — PAIN SCALES - GENERAL
PAINLEVEL_OUTOF10: 7
PAINLEVEL_OUTOF10: 7

## 2023-08-16 ASSESSMENT — PAIN DESCRIPTION - LOCATION
LOCATION: ANKLE
LOCATION: FOOT

## 2023-08-16 ASSESSMENT — ENCOUNTER SYMPTOMS: COLOR CHANGE: 1

## 2023-08-16 ASSESSMENT — PAIN - FUNCTIONAL ASSESSMENT: PAIN_FUNCTIONAL_ASSESSMENT: 0-10

## 2023-08-16 ASSESSMENT — PAIN DESCRIPTION - FREQUENCY: FREQUENCY: INTERMITTENT

## 2023-08-16 NOTE — DISCHARGE INSTRUCTIONS
Wear boot when ambulating. Use crutches as needed for comfort. Keep foot elevated while at rest.  Take medication as prescribed. Norco can cause drowsiness. Follow-up with podiatrist provided for further evaluation.

## 2023-08-16 NOTE — ED NOTES
Patient playing basketball yesterday and rolled right ankle. Ankle is swollen and painful, patient reports pain 7/10. Ice bag given patient to apply affected ankle. Patient is able to minimally bear weight with assistance on foot/ankle to ambulate.       Mi Joe., RN  29/61/24 [id number]

## 2023-08-16 NOTE — ED PROVIDER NOTES
[facility]  eMERGENCY dEPARTMENT eNCOUnter      Pt Name: Kesha Marquez  MRN: [id number]  [address] [facility] 2000  Date of evaluation: 2023  Provider: KATY Coffey CNP    CHIEF COMPLAINT       Chief Complaint   Patient presents with    Foot Injury     Right twisted yesterday while playing basketball         HISTORY OF PRESENT ILLNESS  (Location/Symptom, Timing/Onset, Context/Setting, Quality, Duration, Modifying Factors, Severity.)   Kesha Marquez is a 24 y.o. male who presents to the emergency department for evaluation of right foot injury after he twisted his foot playing basketball yesterday. Pain 7 out of 10. He Is able to bear weight on his foot. No ankle pain or other injury. Nursing Notes were reviewed. ALLERGIES     Patient has no known allergies. CURRENT MEDICATIONS       Previous Medications    No medications on file       PAST MEDICAL HISTORY   History reviewed. No pertinent past medical history. SURGICAL HISTORY     History reviewed. No pertinent surgical history. FAMILY HISTORY           Problem Relation Age of Onset    Diabetes Father     Diabetes Paternal Grandmother     Cancer Paternal Grandmother      Family Status   Relation Name Status    Mother  Alive    Father  Alive    MGM  Alive    MGF  Alive    [address] [place] [address]      PGF          SOCIAL HISTORY      reports that he has been smoking cigars. He has never used smokeless tobacco. He reports current drug use. Drug: Marijuana Adela Sheehan). He reports that he does not drink alcohol. REVIEW OF SYSTEMS    (2-9 systems for level 4, 10 or more for level 5)   Review of Systems   Constitutional:  Positive for activity change. Musculoskeletal:  Positive for arthralgias, gait problem and joint swelling. Skin:  Positive for color change. Negative for wound. All other systems reviewed and are negative. Except as noted above the remainder of the review of systems was reviewed and negative.      PHYSICAL EXAM

## 2023-08-31 ENCOUNTER — HOSPITAL ENCOUNTER (OUTPATIENT)
Dept: VASCULAR LAB | Age: 23
Discharge: HOME | End: 2023-09-02
Attending: PODIATRIST
Payer: COMMERCIAL

## 2023-08-31 DIAGNOSIS — I80.201 DEEP VEIN THROMBOPHLEBITIS OF RIGHT LEG (HCC): ICD-10-CM

## 2023-08-31 DIAGNOSIS — M79.661 PAIN OF RIGHT LOWER LEG: ICD-10-CM

## 2023-08-31 DIAGNOSIS — R60.0 LOWER EXTREMITY EDEMA: ICD-10-CM

## 2023-08-31 PROCEDURE — 93971 EXTREMITY STUDY: CPT

## 2023-08-31 PROCEDURE — 93971 EXTREMITY STUDY: CPT | Performed by: SURGERY

## 2024-01-18 ENCOUNTER — OFFICE VISIT (OUTPATIENT)
Dept: PRIMARY CARE CLINIC | Age: 24
End: 2024-01-18
Payer: COMMERCIAL

## 2024-01-18 VITALS
DIASTOLIC BLOOD PRESSURE: 72 MMHG | SYSTOLIC BLOOD PRESSURE: 122 MMHG | BODY MASS INDEX: 34.25 KG/M2 | OXYGEN SATURATION: 98 % | WEIGHT: 274 LBS | HEART RATE: 80 BPM

## 2024-01-18 DIAGNOSIS — G89.29 CHRONIC BILATERAL LOW BACK PAIN WITHOUT SCIATICA: Primary | ICD-10-CM

## 2024-01-18 DIAGNOSIS — M54.50 CHRONIC BILATERAL LOW BACK PAIN WITHOUT SCIATICA: Primary | ICD-10-CM

## 2024-01-18 PROCEDURE — 99213 OFFICE O/P EST LOW 20 MIN: CPT | Performed by: PHYSICIAN ASSISTANT

## 2024-01-18 PROCEDURE — G8427 DOCREV CUR MEDS BY ELIG CLIN: HCPCS | Performed by: PHYSICIAN ASSISTANT

## 2024-01-18 PROCEDURE — G8484 FLU IMMUNIZE NO ADMIN: HCPCS | Performed by: PHYSICIAN ASSISTANT

## 2024-01-18 PROCEDURE — 4004F PT TOBACCO SCREEN RCVD TLK: CPT | Performed by: PHYSICIAN ASSISTANT

## 2024-01-18 PROCEDURE — G8417 CALC BMI ABV UP PARAM F/U: HCPCS | Performed by: PHYSICIAN ASSISTANT

## 2024-01-18 RX ORDER — IBUPROFEN 800 MG/1
800 TABLET ORAL EVERY 8 HOURS PRN
Qty: 30 TABLET | Refills: 0 | Status: SHIPPED | OUTPATIENT
Start: 2024-01-18

## 2024-01-18 ASSESSMENT — ENCOUNTER SYMPTOMS
SORE THROAT: 0
ABDOMINAL DISTENTION: 0
CHEST TIGHTNESS: 0
CONSTIPATION: 0
SHORTNESS OF BREATH: 0
COUGH: 0
ABDOMINAL PAIN: 0
DIARRHEA: 0
BACK PAIN: 1

## 2024-01-18 NOTE — PROGRESS NOTES
motion and neck supple.   Lymphadenopathy:      Cervical: No cervical adenopathy.   Skin:     General: Skin is warm and dry.   Neurological:      Mental Status: He is alert and oriented to person, place, and time.   Psychiatric:         Mood and Affect: Mood normal.         Behavior: Behavior normal.         Thought Content: Thought content normal.         Assessment and Plan:          1. Chronic bilateral low back pain without sciatica  -     ibuprofen (ADVIL;MOTRIN) 800 MG tablet; Take 1 tablet by mouth every 8 hours as needed for Pain, Disp-30 tablet, R-0Normal   Okay for FMLA as last time.   Continue with supportive care.   Follow up if worsen.             No follow-ups on file.     Patient given educational materials - see patient instructions.  Discussed use, benefit, and side effects of prescribed medications.  All patient questions answered. Pt voiced understanding. Reviewed health maintenance.  Instructed to continue current medications, diet and exercise.  Patient agreed with treatment plan. Follow up as directed.     Electronically signed by RAHUL Tyler on 1/18/2024 at 1:53 PM

## 2024-01-25 ENCOUNTER — TELEPHONE (OUTPATIENT)
Dept: PRIMARY CARE CLINIC | Age: 24
End: 2024-01-25

## 2024-01-25 NOTE — TELEPHONE ENCOUNTER
Patient states he dropped off FMLA forms 2 weeks ago at our office and they still have not been submitted to his employer. Patient states the forms are due 01/27.    Blank form found in patients chart but no completed form. Placed at PCP MA's desk.

## 2024-06-24 ENCOUNTER — TELEPHONE (OUTPATIENT)
Dept: PRIMARY CARE CLINIC | Age: 24
End: 2024-06-24

## 2024-06-24 NOTE — TELEPHONE ENCOUNTER
Pt called in and stated that on question 8 of his [place] [employer] it should state that he has 2 episodes per week, and that he works 9 hours a day not 8 and sometimes he does work on Saturdays.  He needs this fixed and faxed back in.

## 2024-08-01 ENCOUNTER — OFFICE VISIT (OUTPATIENT)
Dept: PRIMARY CARE CLINIC | Age: 24
End: 2024-08-01
Payer: COMMERCIAL

## 2024-08-01 VITALS
DIASTOLIC BLOOD PRESSURE: 86 MMHG | HEIGHT: 75 IN | BODY MASS INDEX: 33.82 KG/M2 | OXYGEN SATURATION: 96 % | WEIGHT: 272 LBS | HEART RATE: 60 BPM | SYSTOLIC BLOOD PRESSURE: 122 MMHG

## 2024-08-01 DIAGNOSIS — Z23 NEED FOR VACCINATION: ICD-10-CM

## 2024-08-01 DIAGNOSIS — M54.50 CHRONIC BILATERAL LOW BACK PAIN WITHOUT SCIATICA: Primary | ICD-10-CM

## 2024-08-01 DIAGNOSIS — G89.29 CHRONIC BILATERAL LOW BACK PAIN WITHOUT SCIATICA: Primary | ICD-10-CM

## 2024-08-01 PROCEDURE — 99214 OFFICE O/P EST MOD 30 MIN: CPT | Performed by: PHYSICIAN ASSISTANT

## 2024-08-01 PROCEDURE — G8417 CALC BMI ABV UP PARAM F/U: HCPCS | Performed by: PHYSICIAN ASSISTANT

## 2024-08-01 PROCEDURE — 90734 MENACWYD/MENACWYCRM VACC IM: CPT | Performed by: PHYSICIAN ASSISTANT

## 2024-08-01 PROCEDURE — 4004F PT TOBACCO SCREEN RCVD TLK: CPT | Performed by: PHYSICIAN ASSISTANT

## 2024-08-01 PROCEDURE — 90471 IMMUNIZATION ADMIN: CPT | Performed by: PHYSICIAN ASSISTANT

## 2024-08-01 PROCEDURE — G8427 DOCREV CUR MEDS BY ELIG CLIN: HCPCS | Performed by: PHYSICIAN ASSISTANT

## 2024-08-01 RX ORDER — CELECOXIB 200 MG/1
200 CAPSULE ORAL 2 TIMES DAILY
Qty: 60 CAPSULE | Refills: 0 | Status: SHIPPED | OUTPATIENT
Start: 2024-08-01

## 2024-08-01 RX ORDER — FAMOTIDINE 20 MG/1
20 TABLET, FILM COATED ORAL 2 TIMES DAILY PRN
Qty: 60 TABLET | Refills: 5 | Status: SHIPPED | OUTPATIENT
Start: 2024-08-01

## 2024-08-01 SDOH — ECONOMIC STABILITY: FOOD INSECURITY: WITHIN THE PAST 12 MONTHS, YOU WORRIED THAT YOUR FOOD WOULD RUN OUT BEFORE YOU GOT MONEY TO BUY MORE.: NEVER TRUE

## 2024-08-01 SDOH — ECONOMIC STABILITY: INCOME INSECURITY: HOW HARD IS IT FOR YOU TO PAY FOR THE VERY BASICS LIKE FOOD, HOUSING, MEDICAL CARE, AND HEATING?: NOT HARD AT ALL

## 2024-08-01 SDOH — ECONOMIC STABILITY: FOOD INSECURITY: WITHIN THE PAST 12 MONTHS, THE FOOD YOU BOUGHT JUST DIDN'T LAST AND YOU DIDN'T HAVE MONEY TO GET MORE.: NEVER TRUE

## 2024-08-01 ASSESSMENT — ENCOUNTER SYMPTOMS
CHEST TIGHTNESS: 0
COUGH: 0
DIARRHEA: 0
BACK PAIN: 1
CONSTIPATION: 0
SORE THROAT: 0
ABDOMINAL DISTENTION: 0
ABDOMINAL PAIN: 0
SHORTNESS OF BREATH: 0

## 2024-08-01 NOTE — PROGRESS NOTES
[facility]  [facility] PRIMARY CARE  [address]  [facility] [address]  Dept: [phone]    Ayana Frausto is a 24 y.o. male Established patient, who presents today for his medical conditions/complaints as noted below.      Chief Complaint   Patient presents with    bilateral back pain     Patient here for f/u. No new concerns       HPI:     HPI: The patient is a pleasant 24-year-old male who presents today with concerns of follow-up for back pain.  Patient currently taking ibuprofen as needed.  Significant past medical history of obesity BMI 34.0. The patient is having pain worse with bedding or lifting. Needs his FMLA redone for this.     Reviewed prior notes None  Reviewed previous Labs    No components found for: \"LDLCHOLESTEROL\", \"LDLCALC\"    (goal LDL is <100)   BUN (mg/dL)   Date Value   07/16/2021 9     BP Readings from Last 3 Encounters:   08/01/24 122/86   01/18/24 122/72   08/16/23 136/88          (goal 120/80)  No results found for: \"LABA1C\"  History reviewed. No pertinent past medical history.   History reviewed. No pertinent surgical history.    Family History   Problem Relation Age of Onset    Diabetes Father     Diabetes Paternal Grandmother     Cancer Paternal Grandmother        Social History     Tobacco Use    Smoking status: Some Days     Types: Cigars    Smokeless tobacco: Never    Tobacco comments:     3-4 cigars daily as of 6/11/19   Substance Use Topics    Alcohol use: No     Comment: not often      Current Outpatient Medications   Medication Sig Dispense Refill    celecoxib (CELEBREX) 200 MG capsule Take 1 capsule by mouth 2 times daily 60 capsule 0    famotidine (PEPCID) 20 MG tablet Take 1 tablet by mouth 2 times daily as needed (Abd pain) 60 tablet 5     No current facility-administered medications for this visit.     No Known Allergies    Health Maintenance   Topic Date Due    COVID-19 Vaccine (1) Never done    Pneumococcal 0-64 years Vaccine (1 of 2 -

## 2024-09-01 DIAGNOSIS — G89.29 CHRONIC BILATERAL LOW BACK PAIN WITHOUT SCIATICA: ICD-10-CM

## 2024-09-01 DIAGNOSIS — M54.50 CHRONIC BILATERAL LOW BACK PAIN WITHOUT SCIATICA: ICD-10-CM

## 2024-09-03 RX ORDER — CELECOXIB 200 MG/1
200 CAPSULE ORAL 2 TIMES DAILY
Qty: 60 CAPSULE | Refills: 0 | Status: SHIPPED | OUTPATIENT
Start: 2024-09-03

## 2024-12-23 ENCOUNTER — OFFICE VISIT (OUTPATIENT)
Dept: PRIMARY CARE CLINIC | Age: 24
End: 2024-12-23

## 2024-12-23 VITALS
HEIGHT: 75 IN | SYSTOLIC BLOOD PRESSURE: 122 MMHG | HEART RATE: 75 BPM | WEIGHT: 275 LBS | OXYGEN SATURATION: 99 % | DIASTOLIC BLOOD PRESSURE: 80 MMHG | BODY MASS INDEX: 34.19 KG/M2

## 2024-12-23 DIAGNOSIS — Z72.0 TOBACCO ABUSE: ICD-10-CM

## 2024-12-23 DIAGNOSIS — Z23 NEED FOR VACCINATION: ICD-10-CM

## 2024-12-23 DIAGNOSIS — M54.50 CHRONIC BILATERAL LOW BACK PAIN WITHOUT SCIATICA: ICD-10-CM

## 2024-12-23 DIAGNOSIS — G89.29 CHRONIC BILATERAL LOW BACK PAIN WITHOUT SCIATICA: ICD-10-CM

## 2024-12-23 DIAGNOSIS — Z00.00 ANNUAL PHYSICAL EXAM: Primary | ICD-10-CM

## 2024-12-23 ASSESSMENT — PATIENT HEALTH QUESTIONNAIRE - PHQ9
SUM OF ALL RESPONSES TO PHQ QUESTIONS 1-9: 0
2. FEELING DOWN, DEPRESSED OR HOPELESS: NOT AT ALL
DEPRESSION UNABLE TO ASSESS: FUNCTIONAL CAPACITY MOTIVATION LIMITS ACCURACY
1. LITTLE INTEREST OR PLEASURE IN DOING THINGS: NOT AT ALL
SUM OF ALL RESPONSES TO PHQ QUESTIONS 1-9: 0
SUM OF ALL RESPONSES TO PHQ9 QUESTIONS 1 & 2: 0
SUM OF ALL RESPONSES TO PHQ QUESTIONS 1-9: 0
SUM OF ALL RESPONSES TO PHQ QUESTIONS 1-9: 0

## 2024-12-23 ASSESSMENT — ENCOUNTER SYMPTOMS
COUGH: 0
CHEST TIGHTNESS: 0
SORE THROAT: 0
SHORTNESS OF BREATH: 0
DIARRHEA: 0
CONSTIPATION: 0
BACK PAIN: 1
ABDOMINAL PAIN: 0

## 2024-12-23 NOTE — PROGRESS NOTES
[facility]  [facility] PRIMARY CARE  [address]  [place] [place] [address]  Dept: [phone]    Ayana Frausto is a 24 y.o. male Established patient, who presents today for his medical conditions/complaints as noted below.      Chief Complaint   Patient presents with    Back Pain     Patient states they the following concerns still has pain, needs continuation of fmla         HPI:     History of Present Illness  The patient presents for evaluation of back pain and renewal of FMLA.    She reports experiencing back pain, which she attributes to her new job that requires prolonged standing for approximately 9 hours daily. This routine is maintained 5 to 6 days per week. She has been utilizing her FMLA leave, currently set at 2 days per week, to manage this discomfort. Despite taking prescribed medication and occasional rest days, the pain persists into the following day. She has not undergone any physical therapy or imaging studies due to insurance coverage issues. A consultation with Dr. Loaiza, a spine specialist, was conducted in 2023.    SOCIAL HISTORY  The patient smokes marijuana but does not smoke cigarettes.      Reviewed prior notes None  Reviewed previous Labs    No results found for: \"LDL\", \"HDL\"    (goal LDL is <100)   BUN (mg/dL)   Date Value   07/16/2021 9     BP Readings from Last 3 Encounters:   12/23/24 122/80   08/01/24 122/86   01/18/24 122/72          (goal 120/80)  No results found for: \"LABA1C\"  History reviewed. No pertinent past medical history.   History reviewed. No pertinent surgical history.    Family History   Problem Relation Age of Onset    Diabetes Father     Diabetes Paternal Grandmother     Cancer Paternal Grandmother        Social History     Tobacco Use    Smoking status: Some Days     Types: Cigars    Smokeless tobacco: Never    Tobacco comments:     3-4 cigars daily as of 6/11/19   Substance Use Topics    Alcohol use: No     Comment: not often

## 2025-01-02 ENCOUNTER — OFFICE VISIT (OUTPATIENT)
Dept: PRIMARY CARE CLINIC | Age: 25
End: 2025-01-02

## 2025-01-02 VITALS
DIASTOLIC BLOOD PRESSURE: 80 MMHG | HEIGHT: 75 IN | BODY MASS INDEX: 34.19 KG/M2 | HEART RATE: 61 BPM | OXYGEN SATURATION: 99 % | WEIGHT: 275 LBS | SYSTOLIC BLOOD PRESSURE: 118 MMHG

## 2025-01-02 DIAGNOSIS — G89.29 CHRONIC BILATERAL LOW BACK PAIN WITHOUT SCIATICA: ICD-10-CM

## 2025-01-02 DIAGNOSIS — R31.21 ASYMPTOMATIC MICROSCOPIC HEMATURIA: ICD-10-CM

## 2025-01-02 DIAGNOSIS — R31.9 HEMATURIA, UNSPECIFIED TYPE: ICD-10-CM

## 2025-01-02 DIAGNOSIS — M54.50 CHRONIC BILATERAL LOW BACK PAIN WITHOUT SCIATICA: ICD-10-CM

## 2025-01-02 DIAGNOSIS — Z72.0 TOBACCO ABUSE: Primary | ICD-10-CM

## 2025-01-02 LAB
BILIRUBIN, URINE, POC: NEGATIVE
BLOOD URINE, POC: NEGATIVE
GLUCOSE URINE, POC: NEGATIVE MG/DL
KETONES, URINE, POC: NEGATIVE MG/DL
LEUKOCYTE ESTERASE, URINE, POC: NEGATIVE
NITRITE, URINE, POC: NEGATIVE
PH, URINE, POC: 6.5 (ref 4.6–8)
PROTEIN,URINE, POC: NEGATIVE MG/DL
SPECIFIC GRAVITY, URINE, POC: 1.01 (ref 1–1.03)
URINALYSIS CLARITY, POC: CLEAR
URINALYSIS COLOR, POC: YELLOW
UROBILINOGEN, POC: NORMAL MG/DL

## 2025-01-02 ASSESSMENT — PATIENT HEALTH QUESTIONNAIRE - PHQ9
DEPRESSION UNABLE TO ASSESS: FUNCTIONAL CAPACITY MOTIVATION LIMITS ACCURACY
SUM OF ALL RESPONSES TO PHQ9 QUESTIONS 1 & 2: 0
SUM OF ALL RESPONSES TO PHQ QUESTIONS 1-9: 0
1. LITTLE INTEREST OR PLEASURE IN DOING THINGS: NOT AT ALL
2. FEELING DOWN, DEPRESSED OR HOPELESS: NOT AT ALL
SUM OF ALL RESPONSES TO PHQ QUESTIONS 1-9: 0

## 2025-01-02 ASSESSMENT — ENCOUNTER SYMPTOMS
CHEST TIGHTNESS: 0
SHORTNESS OF BREATH: 0
COUGH: 0

## 2025-01-02 NOTE — PROGRESS NOTES
[facility]  [facility]  [id number]  [facility] [address]  Dept: [phone]    Ayana Frausto is a 24 y.o. male Established patient, who presents today for his medical conditions/complaints as noted below.      Chief Complaint   Patient presents with    Hematuria     Had physical on Monday and urine came up with blood in urine       HPI:     History of Present Illness  The patient presents for evaluation of microscopic hematuria.    He was previously evaluated at [facility], where a physical examination was conducted, and a urine sample revealed the presence of microscopic blood. He reports no dysuria or pain during urination but notes an increased frequency of urination. He does not have any back pain.      Reviewed prior notes None  Reviewed previous Labs    No results found for: \"LDL\", \"HDL\"    (goal LDL is <100)   BUN (mg/dL)   Date Value   07/16/2021 9     BP Readings from Last 3 Encounters:   01/02/25 118/80   12/23/24 122/80   08/01/24 122/86          (goal 120/80)  No results found for: \"LABA1C\"  History reviewed. No pertinent past medical history.   History reviewed. No pertinent surgical history.    Family History   Problem Relation Age of Onset    Diabetes Father     Diabetes Paternal Grandmother     Cancer Paternal Grandmother        Social History     Tobacco Use    Smoking status: Some Days     Types: Cigars    Smokeless tobacco: Never    Tobacco comments:     3-4 cigars daily as of 6/11/19   Substance Use Topics    Alcohol use: No     Comment: not often      Current Outpatient Medications   Medication Sig Dispense Refill    celecoxib (CELEBREX) 200 MG capsule TAKE 1 CAPSULE BY MOUTH TWICE A DAY 60 capsule 0    famotidine (PEPCID) 20 MG tablet Take 1 tablet by mouth 2 times daily as needed (Abd pain) 60 tablet 5     No current facility-administered medications for this visit.     No Known Allergies    Health Maintenance   Topic Date Due

## 2025-01-22 PROBLEM — Z00.00 ANNUAL PHYSICAL EXAM: Status: RESOLVED | Noted: 2024-12-23 | Resolved: 2025-01-22

## 2025-01-27 DIAGNOSIS — Z00.00 ANNUAL PHYSICAL EXAM: ICD-10-CM

## 2025-01-27 LAB
ALBUMIN/GLOBULIN RATIO: 1.8 (ref 1–2.5)
ALBUMIN: 4.6 G/DL (ref 3.5–5.2)
ALP BLD-CCNC: 86 U/L (ref 40–129)
ALT SERPL-CCNC: 24 U/L (ref 10–50)
ANION GAP SERPL CALCULATED.3IONS-SCNC: 10 MMOL/L (ref 9–16)
AST SERPL-CCNC: 23 U/L (ref 10–50)
BASOPHILS ABSOLUTE: <0.03 K/UL (ref 0–0.2)
BASOPHILS RELATIVE PERCENT: 1 % (ref 0–2)
BILIRUB SERPL-MCNC: 0.7 MG/DL (ref 0–1.2)
BUN BLDV-MCNC: 8 MG/DL (ref 6–20)
CALCIUM SERPL-MCNC: 9.4 MG/DL (ref 8.6–10.4)
CHLORIDE BLD-SCNC: 102 MMOL/L (ref 98–107)
CHOLESTEROL, FASTING: 135 MG/DL (ref 0–199)
CHOLESTEROL/HDL RATIO: 2.6
CO2: 25 MMOL/L (ref 20–31)
CREAT SERPL-MCNC: 0.9 MG/DL (ref 0.7–1.2)
EOSINOPHILS ABSOLUTE: 0.2 K/UL (ref 0–0.44)
EOSINOPHILS RELATIVE PERCENT: 5 % (ref 1–4)
GFR, ESTIMATED: >90 ML/MIN/1.73M2
GLUCOSE BLD-MCNC: 97 MG/DL (ref 74–99)
HCT VFR BLD CALC: 45.4 % (ref 40.7–50.3)
HDLC SERPL-MCNC: 51 MG/DL
HEMOGLOBIN: 15.5 G/DL (ref 13–17)
HEPATITIS C ANTIBODY: NONREACTIVE
HIV AG/AB: NONREACTIVE
IMMATURE GRANULOCYTES %: 0 %
IMMATURE GRANULOCYTES ABSOLUTE: <0.03 K/UL (ref 0–0.3)
LDL CHOLESTEROL: 75 MG/DL (ref 0–100)
LYMPHOCYTES ABSOLUTE: 1.45 K/UL (ref 1.1–3.7)
LYMPHOCYTES RELATIVE PERCENT: 34 % (ref 24–43)
MCH RBC QN AUTO: 30.5 PG (ref 25.2–33.5)
MCHC RBC AUTO-ENTMCNC: 34.1 G/DL (ref 28.4–34.8)
MCV RBC AUTO: 89.4 FL (ref 82.6–102.9)
MONOCYTES ABSOLUTE: 0.4 K/UL (ref 0.1–1.2)
MONOCYTES RELATIVE PERCENT: 9 % (ref 3–12)
NEUTROPHILS ABSOLUTE: 2.26 K/UL (ref 1.5–8.1)
NEUTROPHILS RELATIVE PERCENT: 51 % (ref 36–65)
NRBC AUTOMATED: 0 PER 100 WBC
PDW BLD-RTO: 12.2 % (ref 11.8–14.4)
PLATELET # BLD: 328 K/UL (ref 138–453)
PMV BLD AUTO: 9.1 FL (ref 8.1–13.5)
POTASSIUM SERPL-SCNC: 4 MMOL/L (ref 3.7–5.3)
RBC # BLD: 5.08 M/UL (ref 4.21–5.77)
SODIUM BLD-SCNC: 137 MMOL/L (ref 136–145)
TOTAL PROTEIN: 7.1 G/DL (ref 6.6–8.7)
TRIGLYCERIDE, FASTING: 45 MG/DL (ref 0–149)
VLDLC SERPL CALC-MCNC: 9 MG/DL (ref 1–30)
WBC # BLD: 4.3 K/UL (ref 3.5–11.3)

## 2025-06-13 ENCOUNTER — TELEPHONE (OUTPATIENT)
Dept: PRIMARY CARE CLINIC | Age: 25
End: 2025-06-13

## 2025-06-13 NOTE — TELEPHONE ENCOUNTER
Writer called patient, apologized for not having paperwork filled out, advised that he has appt on 6/23/2025 to see Daniel Wagner PA-C and it will be filled out at that appointment. Patient stated needed to be completed before then and writer gave patient an appointment for next Wednesday so that he can be seen and have paperwork filled out before due date.

## 2025-06-13 NOTE — TELEPHONE ENCOUNTER
Patient called in stating he dropped his FMLA papers off on Friday 6/6/25 & still has not heard anything back. Would like an update. Lauren ashley MA messaged.

## 2025-06-18 ENCOUNTER — OFFICE VISIT (OUTPATIENT)
Dept: PRIMARY CARE CLINIC | Age: 25
End: 2025-06-18
Payer: COMMERCIAL

## 2025-06-18 VITALS
BODY MASS INDEX: 32.45 KG/M2 | WEIGHT: 261 LBS | SYSTOLIC BLOOD PRESSURE: 118 MMHG | OXYGEN SATURATION: 99 % | HEIGHT: 75 IN | HEART RATE: 60 BPM | DIASTOLIC BLOOD PRESSURE: 78 MMHG

## 2025-06-18 DIAGNOSIS — M54.50 CHRONIC BILATERAL LOW BACK PAIN WITHOUT SCIATICA: Primary | ICD-10-CM

## 2025-06-18 DIAGNOSIS — G89.29 CHRONIC BILATERAL LOW BACK PAIN WITHOUT SCIATICA: Primary | ICD-10-CM

## 2025-06-18 DIAGNOSIS — Z23 NEED FOR VACCINATION: ICD-10-CM

## 2025-06-18 PROCEDURE — 90471 IMMUNIZATION ADMIN: CPT | Performed by: PHYSICIAN ASSISTANT

## 2025-06-18 PROCEDURE — 90715 TDAP VACCINE 7 YRS/> IM: CPT | Performed by: PHYSICIAN ASSISTANT

## 2025-06-18 PROCEDURE — 4004F PT TOBACCO SCREEN RCVD TLK: CPT | Performed by: PHYSICIAN ASSISTANT

## 2025-06-18 PROCEDURE — G8427 DOCREV CUR MEDS BY ELIG CLIN: HCPCS | Performed by: PHYSICIAN ASSISTANT

## 2025-06-18 PROCEDURE — 99214 OFFICE O/P EST MOD 30 MIN: CPT | Performed by: PHYSICIAN ASSISTANT

## 2025-06-18 PROCEDURE — G8417 CALC BMI ABV UP PARAM F/U: HCPCS | Performed by: PHYSICIAN ASSISTANT

## 2025-06-18 SDOH — ECONOMIC STABILITY: FOOD INSECURITY: WITHIN THE PAST 12 MONTHS, THE FOOD YOU BOUGHT JUST DIDN'T LAST AND YOU DIDN'T HAVE MONEY TO GET MORE.: NEVER TRUE

## 2025-06-18 SDOH — ECONOMIC STABILITY: INCOME INSECURITY: IN THE LAST 12 MONTHS, WAS THERE A TIME WHEN YOU WERE NOT ABLE TO PAY THE MORTGAGE OR RENT ON TIME?: NO

## 2025-06-18 SDOH — ECONOMIC STABILITY: FOOD INSECURITY: WITHIN THE PAST 12 MONTHS, YOU WORRIED THAT YOUR FOOD WOULD RUN OUT BEFORE YOU GOT MONEY TO BUY MORE.: NEVER TRUE

## 2025-06-18 SDOH — ECONOMIC STABILITY: TRANSPORTATION INSECURITY
IN THE PAST 12 MONTHS, HAS THE LACK OF TRANSPORTATION KEPT YOU FROM MEDICAL APPOINTMENTS OR FROM GETTING MEDICATIONS?: NO

## 2025-06-18 SDOH — ECONOMIC STABILITY: FOOD INSECURITY: WITHIN THE PAST 12 MONTHS, YOU WORRIED THAT YOUR FOOD WOULD RUN OUT BEFORE YOU GOT MONEY TO BUY MORE.: PATIENT DECLINED

## 2025-06-18 SDOH — ECONOMIC STABILITY: FOOD INSECURITY: WITHIN THE PAST 12 MONTHS, THE FOOD YOU BOUGHT JUST DIDN'T LAST AND YOU DIDN'T HAVE MONEY TO GET MORE.: PATIENT DECLINED

## 2025-06-18 SDOH — ECONOMIC STABILITY: TRANSPORTATION INSECURITY
IN THE PAST 12 MONTHS, HAS LACK OF TRANSPORTATION KEPT YOU FROM MEETINGS, WORK, OR FROM GETTING THINGS NEEDED FOR DAILY LIVING?: NO

## 2025-06-18 ASSESSMENT — PATIENT HEALTH QUESTIONNAIRE - PHQ9
SUM OF ALL RESPONSES TO PHQ QUESTIONS 1-9: 0
DEPRESSION UNABLE TO ASSESS: FUNCTIONAL CAPACITY MOTIVATION LIMITS ACCURACY
SUM OF ALL RESPONSES TO PHQ QUESTIONS 1-9: 0
1. LITTLE INTEREST OR PLEASURE IN DOING THINGS: NOT AT ALL
2. FEELING DOWN, DEPRESSED OR HOPELESS: NOT AT ALL
SUM OF ALL RESPONSES TO PHQ QUESTIONS 1-9: 0
SUM OF ALL RESPONSES TO PHQ QUESTIONS 1-9: 0

## 2025-06-18 ASSESSMENT — ENCOUNTER SYMPTOMS
SORE THROAT: 0
SHORTNESS OF BREATH: 0
ABDOMINAL DISTENTION: 0
ABDOMINAL PAIN: 0
CHEST TIGHTNESS: 0
BACK PAIN: 1
DIARRHEA: 0
CONSTIPATION: 0
COUGH: 0

## 2025-06-18 NOTE — PROGRESS NOTES
[facility]  [facility]  [address] [facility] [address]  Dept: [phone]    Ayana Frausto is a 25 y.o. male Established patient, who presents today for his medical conditions/complaints as noted below.      Chief Complaint   Patient presents with    Lower Back Pain     Patient states they the following concerns discuss options        HPI:     History of Present Illness  The patient is a pleasant 25-year-old male who presents today for follow-up on [facility] paperwork for back pain.    He continues to experience back pain, which he attributes to his work environment. Despite transitioning to a different team and assuming new responsibilities, the pain persists. He reports no radiating pain down his legs. He has not yet engaged in physical therapy since his last visit. He finds relief from his symptoms by taking 2 days off work per week.      Reviewed prior notes: None  Reviewed previous: Labs    LDL Cholesterol (mg/dL)   Date Value   01/27/2025 75     HDL (mg/dL)   Date Value   01/27/2025 51       (goal LDL is <100)   AST (U/L)   Date Value   01/27/2025 23     ALT (U/L)   Date Value   01/27/2025 24     BUN (mg/dL)   Date Value   01/27/2025 8     BP Readings from Last 3 Encounters:   06/18/25 118/78   01/02/25 118/80   12/23/24 122/80          (goal 120/80)  No results found for: \"LABA1C\"  History reviewed. No pertinent past medical history.   History reviewed. No pertinent surgical history.    Family History   Problem Relation Age of Onset    Diabetes Father     Diabetes Paternal Grandmother     Cancer Paternal Grandmother        Social History     Tobacco Use    Smoking status: Some Days     Types: Cigars    Smokeless tobacco: Never    Tobacco comments:     3-4 cigars daily as of 6/11/19   Substance Use Topics    Alcohol use: No     Comment: not often      Current Outpatient Medications   Medication Sig Dispense Refill    celecoxib (CELEBREX) 200 MG capsule TAKE 1

## 2025-07-08 ENCOUNTER — TELEPHONE (OUTPATIENT)
Dept: PRIMARY CARE CLINIC | Age: 25
End: 2025-07-08

## 2025-07-08 NOTE — TELEPHONE ENCOUNTER
Patient called regarding recent referral to [facility] and imaging of the lower back.    Please advise.    Patient stated office mentioned above does not accept his insurance and is needing a new referral.

## 2025-09-02 ENCOUNTER — TELEPHONE (OUTPATIENT)
Dept: PRIMARY CARE CLINIC | Age: 25
End: 2025-09-02

## 2025-09-02 DIAGNOSIS — M54.50 CHRONIC BILATERAL LOW BACK PAIN WITHOUT SCIATICA: Primary | ICD-10-CM

## 2025-09-02 DIAGNOSIS — G89.29 CHRONIC BILATERAL LOW BACK PAIN WITHOUT SCIATICA: Primary | ICD-10-CM
